# Patient Record
Sex: MALE | Race: WHITE | NOT HISPANIC OR LATINO | URBAN - METROPOLITAN AREA
[De-identification: names, ages, dates, MRNs, and addresses within clinical notes are randomized per-mention and may not be internally consistent; named-entity substitution may affect disease eponyms.]

---

## 2024-09-22 ENCOUNTER — OFFICE VISIT (OUTPATIENT)
Dept: URGENT CARE | Facility: CLINIC | Age: 34
End: 2024-09-22
Payer: COMMERCIAL

## 2024-09-22 VITALS
BODY MASS INDEX: 23.19 KG/M2 | HEART RATE: 75 BPM | SYSTOLIC BLOOD PRESSURE: 125 MMHG | DIASTOLIC BLOOD PRESSURE: 72 MMHG | RESPIRATION RATE: 18 BRPM | TEMPERATURE: 99 F | OXYGEN SATURATION: 98 % | WEIGHT: 175 LBS | HEIGHT: 73 IN

## 2024-09-22 DIAGNOSIS — S61.411A LACERATION OF RIGHT HAND WITHOUT FOREIGN BODY, INITIAL ENCOUNTER: Primary | ICD-10-CM

## 2024-09-22 PROCEDURE — 99213 OFFICE O/P EST LOW 20 MIN: CPT | Mod: S$GLB,,, | Performed by: NURSE PRACTITIONER

## 2024-09-22 RX ORDER — ACETAZOLAMIDE 250 MG/1
TABLET ORAL
COMMUNITY
Start: 2024-04-05

## 2024-09-22 RX ORDER — MUPIROCIN 20 MG/G
OINTMENT TOPICAL DAILY
Status: SHIPPED | OUTPATIENT
Start: 2024-09-22

## 2024-09-22 RX ORDER — MUPIROCIN 20 MG/G
OINTMENT TOPICAL
Qty: 22 G | Refills: 1 | Status: SHIPPED | OUTPATIENT
Start: 2024-09-22

## 2024-09-22 RX ORDER — CEPHALEXIN 500 MG/1
500 CAPSULE ORAL EVERY 6 HOURS
Qty: 14 CAPSULE | Refills: 0 | Status: SHIPPED | OUTPATIENT
Start: 2024-09-22 | End: 2024-09-22

## 2024-09-22 RX ORDER — CEPHALEXIN 500 MG/1
500 CAPSULE ORAL EVERY 6 HOURS
Qty: 28 CAPSULE | Refills: 0 | Status: SHIPPED | OUTPATIENT
Start: 2024-09-22 | End: 2024-09-29

## 2024-09-22 RX ADMIN — MUPIROCIN: 20 OINTMENT TOPICAL at 12:09

## 2024-09-22 NOTE — PROCEDURES
Laceration Repair    Date/Time: 9/22/2024 12:00 PM    Performed by: Shayla Stafford NP  Authorized by: Shayla Stafford NP  Body area: upper extremity  Location details: right hand  Laceration length: 4 cm  Foreign bodies: no foreign bodies  Tendon involvement: none  Nerve involvement: none  Vascular damage: no    Patient sedated: no  Preparation: Patient was prepped and draped in the usual sterile fashion.  Irrigation solution: saline  Irrigation method: syringe  Amount of cleaning: standard  Debridement: none  Degree of undermining: none  Skin closure: glue and Steri-Strips  Dressing: antibiotic ointment and Steri-Strips  Patient tolerance: Patient tolerated the procedure well with no immediate complications  Comments: Discussed may need 1 stitch he discussed with one stitch he would rather glue and steri strips.

## 2024-09-22 NOTE — PROGRESS NOTES
"Subjective:      Patient ID: Nakul Guerrero is a 34 y.o. male.    Vitals:  height is 6' 1" (1.854 m) and weight is 79.4 kg (175 lb). His temperature is 98.5 °F (36.9 °C). His blood pressure is 125/72 and his pulse is 75. His respiration is 18 and oxygen saturation is 98%.     Chief Complaint: Laceration    Pt presents complaints of laceration on the right hand. Injury occurred today. Pt states he was in his yard fixing his fence and did not notice a screw sticking out and when he grabbed the fence the screw cut his right hand. Pain level 2. OTC none   Provider note  Td UPT. Did not puncture but scratch hand    Laceration   The incident occurred less than 1 hour ago. The laceration is located on the Right hand. The laceration mechanism was a nail. The pain is at a severity of 2/10. The pain is mild. The pain has been Constant since onset. He reports no foreign bodies present. His tetanus status is UTD.       Skin:  Positive for laceration.      Objective:     Physical Exam   Constitutional: He is oriented to person, place, and time. He appears well-developed. He is cooperative.  Non-toxic appearance. He does not appear ill. No distress.   HENT:   Head: Normocephalic and atraumatic.   Ears:   Right Ear: Hearing normal.   Left Ear: Hearing normal.   Nose: Nose normal. No mucosal edema, rhinorrhea or nasal deformity. No epistaxis. Right sinus exhibits no maxillary sinus tenderness and no frontal sinus tenderness. Left sinus exhibits no maxillary sinus tenderness and no frontal sinus tenderness.   Mouth/Throat: Uvula is midline, oropharynx is clear and moist and mucous membranes are normal. No trismus in the jaw. Normal dentition. No uvula swelling. No oropharyngeal exudate, posterior oropharyngeal edema or posterior oropharyngeal erythema.   Eyes: Conjunctivae and lids are normal. No scleral icterus.   Neck: Trachea normal and phonation normal. Neck supple. No edema present. No erythema present. No neck rigidity " "present.   Cardiovascular: Normal rate, regular rhythm, normal heart sounds and normal pulses.   Pulmonary/Chest: Effort normal and breath sounds normal. No respiratory distress. He has no decreased breath sounds. He has no rhonchi.   Abdominal: Normal appearance.   Musculoskeletal: Normal range of motion.         General: No deformity. Normal range of motion.      Right hand: He exhibits laceration.   Neurological: He is alert and oriented to person, place, and time. He exhibits normal muscle tone. Coordination normal.   Skin: Skin is warm, dry, intact, not diaphoretic and not pale. Lacerations - upper ext.:  right hand  Psychiatric: His speech is normal and behavior is normal. Judgment and thought content normal.   Nursing note and vitals reviewed.    4" superficial laceration  noted to right palm of hand 1/2 middle part of laceration with .02 cm depth. Bleeding stopped with ice and pressure.   Assessment:     1. Laceration of right hand without foreign body, initial encounter      Discussed 1 stitch maybe beneficial however he decided to glue and steri strip.   Plan:       Laceration of right hand without foreign body, initial encounter  -     mupirocin 2 % ointment  -     mupirocin (BACTROBAN) 2 % ointment; Apply to affected area 3 times daily  Dispense: 22 g; Refill: 1  -     cephALEXin (KEFLEX) 500 MG capsule; Take 1 capsule (500 mg total) by mouth every 6 (six) hours. for 7 days  Dispense: 14 capsule; Refill: 0  -     Laceration Repair    Keep dry  Let steri strips fall off on their own. If bleeding reoccurred. Apply Ice and pressure and antibiotic ointment. Keep covered for 5 days.  ED for any fever, chills or any other complications noted.   Tylenol/ Ibuprofen for pain control as directed.               "

## 2024-09-22 NOTE — PATIENT INSTRUCTIONS
Keep dry  Let steri strips fall off on their own. If bleeding reoccurred. Apply Ice and pressure and antibiotic ointment. Keep covered for 5 days.  ED for any fever, chills or any other complications noted.   Tylenol/ Ibuprofen for pain control as directed.

## 2024-11-20 DIAGNOSIS — Z00.00 ROUTINE MEDICAL EXAM: Primary | ICD-10-CM

## 2024-12-12 ENCOUNTER — CLINICAL SUPPORT (OUTPATIENT)
Dept: INTERNAL MEDICINE | Facility: CLINIC | Age: 34
End: 2024-12-12
Payer: COMMERCIAL

## 2024-12-12 ENCOUNTER — HOSPITAL ENCOUNTER (OUTPATIENT)
Dept: CARDIOLOGY | Facility: CLINIC | Age: 34
Discharge: HOME | End: 2024-12-12
Payer: COMMERCIAL

## 2024-12-12 ENCOUNTER — HOSPITAL ENCOUNTER (OUTPATIENT)
Dept: RADIOLOGY | Facility: HOSPITAL | Age: 34
Discharge: HOME | End: 2024-12-12
Attending: INTERNAL MEDICINE
Payer: COMMERCIAL

## 2024-12-12 ENCOUNTER — OFFICE VISIT (OUTPATIENT)
Dept: INTERNAL MEDICINE | Facility: CLINIC | Age: 34
End: 2024-12-12
Payer: COMMERCIAL

## 2024-12-12 VITALS
HEIGHT: 73 IN | SYSTOLIC BLOOD PRESSURE: 124 MMHG | WEIGHT: 170 LBS | BODY MASS INDEX: 22.53 KG/M2 | DIASTOLIC BLOOD PRESSURE: 73 MMHG | HEART RATE: 64 BPM | OXYGEN SATURATION: 97 %

## 2024-12-12 DIAGNOSIS — R00.2 PALPITATIONS: ICD-10-CM

## 2024-12-12 DIAGNOSIS — Z00.00 ROUTINE MEDICAL EXAM: ICD-10-CM

## 2024-12-12 DIAGNOSIS — Z00.00 ROUTINE GENERAL MEDICAL EXAMINATION AT A HEALTH CARE FACILITY: Primary | ICD-10-CM

## 2024-12-12 DIAGNOSIS — R07.9 CHEST PAIN, UNSPECIFIED TYPE: ICD-10-CM

## 2024-12-12 DIAGNOSIS — Z00.00 ROUTINE PHYSICAL EXAMINATION: Primary | ICD-10-CM

## 2024-12-12 DIAGNOSIS — E87.6 HYPOKALEMIA: ICD-10-CM

## 2024-12-12 LAB
ALBUMIN SERPL BCP-MCNC: 4.4 G/DL (ref 3.5–5.2)
ALP SERPL-CCNC: 77 U/L (ref 40–150)
ALT SERPL W/O P-5'-P-CCNC: 21 U/L (ref 10–44)
ANION GAP SERPL CALC-SCNC: 11 MMOL/L (ref 8–16)
AST SERPL-CCNC: 20 U/L (ref 10–40)
BILIRUB SERPL-MCNC: 0.7 MG/DL (ref 0.1–1)
BUN SERPL-MCNC: 13 MG/DL (ref 6–20)
CALCIUM SERPL-MCNC: 9.5 MG/DL (ref 8.7–10.5)
CHLORIDE SERPL-SCNC: 104 MMOL/L (ref 95–110)
CHOLEST SERPL-MCNC: 163 MG/DL (ref 120–199)
CHOLEST/HDLC SERPL: 3.9 {RATIO} (ref 2–5)
CO2 SERPL-SCNC: 25 MMOL/L (ref 23–29)
CREAT SERPL-MCNC: 1 MG/DL (ref 0.5–1.4)
ERYTHROCYTE [DISTWIDTH] IN BLOOD BY AUTOMATED COUNT: 11.6 % (ref 11.5–14.5)
EST. GFR  (NO RACE VARIABLE): >60 ML/MIN/1.73 M^2
ESTIMATED AVG GLUCOSE: 94 MG/DL (ref 68–131)
GLUCOSE SERPL-MCNC: 81 MG/DL (ref 70–110)
HBA1C MFR BLD: 4.9 % (ref 4–5.6)
HCT VFR BLD AUTO: 46.8 % (ref 40–54)
HCV AB SERPL QL IA: ABNORMAL
HDLC SERPL-MCNC: 42 MG/DL (ref 40–75)
HDLC SERPL: 25.8 % (ref 20–50)
HGB BLD-MCNC: 17.2 G/DL (ref 14–18)
LDLC SERPL CALC-MCNC: 112 MG/DL (ref 63–159)
MCH RBC QN AUTO: 32.3 PG (ref 27–31)
MCHC RBC AUTO-ENTMCNC: 36.8 G/DL (ref 32–36)
MCV RBC AUTO: 88 FL (ref 82–98)
NONHDLC SERPL-MCNC: 121 MG/DL
OHS QRS DURATION: 112 MS
OHS QTC CALCULATION: 425 MS
PLATELET # BLD AUTO: 256 K/UL (ref 150–450)
PMV BLD AUTO: 11.6 FL (ref 9.2–12.9)
POTASSIUM SERPL-SCNC: 3.3 MMOL/L (ref 3.5–5.1)
PROT SERPL-MCNC: 7.4 G/DL (ref 6–8.4)
RBC # BLD AUTO: 5.32 M/UL (ref 4.6–6.2)
SODIUM SERPL-SCNC: 140 MMOL/L (ref 136–145)
TRIGL SERPL-MCNC: 45 MG/DL (ref 30–150)
TSH SERPL DL<=0.005 MIU/L-ACNC: 1.28 UIU/ML (ref 0.4–4)
WBC # BLD AUTO: 9.31 K/UL (ref 3.9–12.7)

## 2024-12-12 PROCEDURE — 93005 ELECTROCARDIOGRAM TRACING: CPT | Mod: S$GLB,,, | Performed by: INTERNAL MEDICINE

## 2024-12-12 PROCEDURE — 80061 LIPID PANEL: CPT | Performed by: INTERNAL MEDICINE

## 2024-12-12 PROCEDURE — 80053 COMPREHEN METABOLIC PANEL: CPT | Performed by: INTERNAL MEDICINE

## 2024-12-12 PROCEDURE — 97750 PHYSICAL PERFORMANCE TEST: CPT | Mod: S$GLB,,, | Performed by: INTERNAL MEDICINE

## 2024-12-12 PROCEDURE — 86803 HEPATITIS C AB TEST: CPT | Performed by: INTERNAL MEDICINE

## 2024-12-12 PROCEDURE — 99999 PR PBB SHADOW E&M-EST. PATIENT-LVL I: CPT | Mod: PBBFAC,,,

## 2024-12-12 PROCEDURE — 71046 X-RAY EXAM CHEST 2 VIEWS: CPT | Mod: TC

## 2024-12-12 PROCEDURE — 71046 X-RAY EXAM CHEST 2 VIEWS: CPT | Mod: 26,,, | Performed by: RADIOLOGY

## 2024-12-12 PROCEDURE — 83036 HEMOGLOBIN GLYCOSYLATED A1C: CPT | Performed by: INTERNAL MEDICINE

## 2024-12-12 PROCEDURE — 97802 MEDICAL NUTRITION INDIV IN: CPT | Mod: S$GLB,,, | Performed by: DIETITIAN, REGISTERED

## 2024-12-12 PROCEDURE — 93010 ELECTROCARDIOGRAM REPORT: CPT | Mod: S$GLB,,, | Performed by: STUDENT IN AN ORGANIZED HEALTH CARE EDUCATION/TRAINING PROGRAM

## 2024-12-12 PROCEDURE — 85027 COMPLETE CBC AUTOMATED: CPT | Performed by: INTERNAL MEDICINE

## 2024-12-12 PROCEDURE — 99999 PR PBB SHADOW E&M-EST. PATIENT-LVL III: CPT | Mod: PBBFAC,,, | Performed by: INTERNAL MEDICINE

## 2024-12-12 PROCEDURE — 84443 ASSAY THYROID STIM HORMONE: CPT | Performed by: INTERNAL MEDICINE

## 2024-12-12 RX ORDER — DOXYCYCLINE HYCLATE 100 MG/1
100 TABLET, DELAYED RELEASE ORAL DAILY
COMMUNITY

## 2024-12-12 NOTE — PROGRESS NOTES
"Nutrition Assessment  Session Time:  45 minutes      Client name:  Nakul Guerrero  :  1990  Age:  34 y.o.  Gender:  male    Client states:  Very pleasant [employer] employee here for his initial Executive Health physical.  Employed with [employer] for the past two years and previously with [employer].  Shares unremarkable PMH and takes no routine rx medications or supplements daily.  Maintains a healthy and active lifestyle, swimming for an hour or more 2-4x/week.  Is conscious of PO intake, inquiring about several specific nutrition-related topics.  Is interested in lipid panel, sharing results from 2.5 years ago, in which HDL was 38 and LDL was ~120-125.  Inquired about factors affecting such.  Expresses anxiety regarding blood draw and MD visits and so, does not see MD regularly unless medically necessary.  Strives to maintain a healthy lifestyle so as to reduce need for medical management.  Typically consumes three meals and occasional snacks daily.  Considers peanut butter and jelly sandwich on wheat to be an optimal breakfast choice for him and selects reduced sugar versions of both.  Dines out for lunch in [place] when working, which he realizes is unhealthy given limited availability of healthy food choices.  Nevertheless, cooks dinner in advance of the week ahead, recalling recent preparation of red beans, rice, and vegetables.  Incorporates fruit and vegetables daily and legumes weekly.  Limits intake of sugary beverages.  Snacks on starchy and sometimes sugary snacks if available.  Overall, wishes to remain proactive in managing health.    Anthropometrics  Height:  6' 1"     Weight:  166#  BMI:  21.9  % Body Fat:  10.5%    Clinical Signs/Symptoms  N/V/D:  None  Appetite:  good       No past medical history on file.    No past surgical history on file.    Medications    has a current medication list which includes the following prescription(s): acetazolamide, doxycycline, and mupirocin, and the following " Facility-Administered Medications: mupirocin.    Vitamins, Minerals, and/or Supplements:  None     Food/Medication Interactions:  Reviewed     Food Allergies or Intolerances:  Skin of soft fruit     Social History    Marital status:    Employment:  [employer]    Social History     Tobacco Use    Smoking status: Never     Passive exposure: Never    Smokeless tobacco: Never   Substance Use Topics    Alcohol use: Not Currently        Lab Reports   Sodium   Date Value Ref Range Status   12/12/2024 140 136 - 145 mmol/L Final     Potassium   Date Value Ref Range Status   12/12/2024 3.3 (L) 3.5 - 5.1 mmol/L Final     Chloride   Date Value Ref Range Status   12/12/2024 104 95 - 110 mmol/L Final     CO2   Date Value Ref Range Status   12/12/2024 25 23 - 29 mmol/L Final     Glucose   Date Value Ref Range Status   12/12/2024 81 70 - 110 mg/dL Final     BUN   Date Value Ref Range Status   12/12/2024 13 6 - 20 mg/dL Final     Creatinine   Date Value Ref Range Status   12/12/2024 1.0 0.5 - 1.4 mg/dL Final     Calcium   Date Value Ref Range Status   12/12/2024 9.5 8.7 - 10.5 mg/dL Final     Total Protein   Date Value Ref Range Status   12/12/2024 7.4 6.0 - 8.4 g/dL Final     Albumin   Date Value Ref Range Status   12/12/2024 4.4 3.5 - 5.2 g/dL Final     Total Bilirubin   Date Value Ref Range Status   12/12/2024 0.7 0.1 - 1.0 mg/dL Final     Comment:     For infants and newborns, interpretation of results should be based  on gestational age, weight and in agreement with clinical  observations.    Premature Infant recommended reference ranges:  Up to 24 hours.............<8.0 mg/dL  Up to 48 hours............<12.0 mg/dL  3-5 days..................<15.0 mg/dL  6-29 days.................<15.0 mg/dL       Alkaline Phosphatase   Date Value Ref Range Status   12/12/2024 77 40 - 150 U/L Final     AST   Date Value Ref Range Status   12/12/2024 20 10 - 40 U/L Final     ALT   Date Value Ref Range Status   12/12/2024 21 10 - 44 U/L  Final     Anion Gap   Date Value Ref Range Status   12/12/2024 11 8 - 16 mmol/L Final      Lab Results   Component Value Date    WBC 9.31 12/12/2024    HGB 17.2 12/12/2024    HCT 46.8 12/12/2024    MCV 88 12/12/2024     12/12/2024        Lab Results   Component Value Date    CHOL 163 12/12/2024     Lab Results   Component Value Date    HDL 42 12/12/2024     Lab Results   Component Value Date    LDLCALC 112.0 12/12/2024     Lab Results   Component Value Date    TRIG 45 12/12/2024     Lab Results   Component Value Date    CHOLHDL 25.8 12/12/2024     Lab Results   Component Value Date    HGBA1C 4.9 12/12/2024     BP Readings from Last 1 Encounters:   12/12/24 124/73       Food History  Breakfast:  Peanut butter and jelly sandwich on wheat + coffee  Mid-morning Snack:  None  Lunch:  Restaurant entree  Mid-afternoon Snack:  Chips/fruit/fresh vegetables  Dinner:  Red beans + rice  Snack:  None  *Fluid intake:  2-3 cups coffee + water    Exercise History:  Swims 60 minutes 2-4x/week    Cultural/Spiritual/Personal Preferences:  None identified    Support System:  friends    State of Change:  Contemplation    Barriers to Change:  None    Diagnosis    Food and nutrition related knowledge deficit related to inadequate nutrition education as evidenced by patient inquiry re:  general nutrition and heart health.    Intervention    RMR (Method:  InBody):  1826 kcal  Activity Factor:  1.4    FRANCIA:  2556 kcal    Goals:  1.  Incorporate a source of lean protein with snacks (examples discussed)  2.  Limit saturated fat intake to 12 gm/day or less  3.  Maintain 150 minutes of physical activity/week or more as tolerated  4.  Maintain healthy eating habits    Nutrition Education  The following education was provided to the patient:  Complimented patient on proactive role in health maintenance.  Complimented patient on physical activity efforts.  Discussed meal planning/[employer] My Plate design.  Discussed healthy snacking.    Discussed Heart Healthy Eating.  Suggested dietary modifications based on current dietary behaviors and individual food preferences.  Discussed macronutrient distribution among meals and snacks, including CHO, protein, and fat recommendations and its importance/benefits.  Discussed physical activity guidelines and its associated benefits.  *Lab results were pending at time of consult and so, not discussed with patient.  Discussed recommended protein intake (may include but not limited to recommended food sources, benefits of adequate protein intake, importance of protein distribution, etc.)   Discussed sugary foods and/or beverages (potential health consequences of excessive sugar intake, ways to reduce sugar intake, healthy beverage alternatives, etc.).  Discussed recommended servings of fruit/day and food sources of such.  Discussed recommended servings of non-starchy vegetables/day and food sources of such.    Patient verbalized understanding of nutrition education and recommendations received.    Handouts Provided  Meal Planning Guide  Eat Fit Shopping List  Fueling Well On-The-Go    Monitoring/Evaluation    Monitor the following:  Weight  BMI  % Body Fat  Caloric intake    Follow Up Plan:  Communication with referring healthcare provider is unnecessary at this time as patient presented as part of annual wellness exam.  However, will follow up with patient in 1-2 years.

## 2024-12-12 NOTE — LETTER
December 12, 2024    Nakul Guerrero  [address] [facility] [address]             Preet Francois [facility]  [address]  [facility] [address]  Phone: [phone]  Fax: [phone] Dear Mr. Guerrero:        Thank you for allowing me to serve you and perform your Executive Health exam on 12/12/2024.  This letter will serve a brief summary of the history, physical findings, and laboratory/studies performed and recommendations at that time.    Reason for Visit: Executive Health Preventive Physical Examination    HPI  History of Present Illness    CHIEF COMPLAINT:  Nakul presents for an executive health visit with concerns about chest pain and palpitations.    HPI:  Patient is new to me, 34-year-old male works for [employer].  He has a few issues to discuss.  Of note is that he says he does not get regular checkups because he does not like getting his blood drawn.  When at all possible he gets fingersticks including for biometric screening and periodically for STD screening.  Nakul reports chest pain for the past 5 years, initially noticed 6-9 months into the COVID-19 pandemic. The pain is localized to the left side of his chest in the rib area, described as musculoskeletal in nature. It is exacerbated by poor posture, such as prolonged sitting in a curled or static position, and can sometimes radiate to his ribs and extend downwards.    He also reports palpitations, described as occasional, single, forceful beats. These typically occur after intense swimming when resting at the pool wall, when supine, during deep breath-holding, or when sitting in a curled position.    Nakul is an avid swimmer and reports maintained swimming performance without deterioration in times or speed. He denies chest pain or palpitations during active swimming.    He expresses concern about a potential undiagnosed congenital heart defect, given his age of 34. He is worried that his high level of  physical activity might be exacerbating an underlying condition.    Nakul denies any known congenital heart defects or other diagnosed cardiac conditions.  No family history of any cardiovascular disease including arrhythmia or congenital till disease.    MEDICATIONS:  Nakul is on Doxycycline as an anti-malarial medication, which are scheduled to terminate in early January.     FAMILY HISTORY:  Family history is significant for mother with high cholesterol. His father has a history of COPD and experienced a transient ischemic attack 15 years ago. Nakul's paternal great aunt had an incapacitating stroke that led to her death, while his paternal grandmother suffered a stroke at age 86-88. His maternal grandmother had multiple strokes, was overweight, and had severe arthritis.    TEST RESULTS:  Nakul's recent Complete Blood Count showed normal levels of white cells, red cells, and platelets. His MCH and MCHC were slightly high, with the former being 0.8 above the normal range. Potassium levels were slightly low, 0.2 below the normal range. Recent cholesterol levels were normal at 160 mg/dL, compared to 174 mg/dL from a finger stick test 2.5 years ago. A1C and TSH tests were normal. The Hepatitis C Antibody Test yielded an equivocal result. An STI panel from 9 months ago was negative for the antibody but he was with a new sexual partner so potentially the opportunity for exposure was there although he says her test was negative as well.. Nakul's recent EKG was normal.  Chest x-ray was clear    IMAGING:  A recent chest XR showed lungs within normal limits with no fluid present. The lungs were clear, heart size was normal, and there were no evident rib fractures.    SOCIAL HISTORY:  Nakul smoked 1.5 packs per day from 2004 until quitting in [id number]. He used smokeless tobacco on and off from 2013 to 2019, quitting 5-6 years ago. Nakul is single and not currently dating.      ROS:  Cardiovascular: +chest pain,  +palpitations             Review of Systems   Constitutional:  Negative for chills, fatigue and fever.   HENT:  Negative for nosebleeds and trouble swallowing.    Eyes:  Negative for pain and visual disturbance.   Respiratory:  Negative for cough, shortness of breath and wheezing.    Cardiovascular:  Positive for chest pain and palpitations.   Gastrointestinal:  Negative for abdominal pain, constipation, diarrhea, nausea and vomiting.   Genitourinary:  Negative for difficulty urinating and hematuria.   Musculoskeletal:  Negative for arthralgias, back pain and neck pain.   Integumentary:  Negative for rash.   Neurological:  Negative for dizziness and headaches.   Hematological:  Does not bruise/bleed easily.   Psychiatric/Behavioral:  Negative for dysphoric mood and sleep disturbance.        History reviewed. No pertinent past medical history.  History reviewed. No pertinent surgical history.   There is no problem list on file for this patient.         Objective:      Physical Exam    Cardiovascular: Heart sounds normal, no murmurs. No tenderness to touch in chest area.  Musculoskeletal: Xiphoid process normal.  Lungs: Rib structure appears normal.       Physical Exam  Constitutional:       General: He is not in acute distress.     Appearance: He is well-developed.      Comments: Thin healthy fit-appearing male     HENT:      Head: Normocephalic and atraumatic.      Right Ear: Tympanic membrane, ear canal and external ear normal.      Left Ear: Tympanic membrane, ear canal and external ear normal.      Mouth/Throat:      Pharynx: No oropharyngeal exudate or posterior oropharyngeal erythema.   Eyes:      General: No scleral icterus.     Conjunctiva/sclera: Conjunctivae normal.      Pupils: Pupils are equal, round, and reactive to light.   Neck:      Thyroid: No thyromegaly.      Comments: No supraclavicular nodes palpated  Cardiovascular:      Rate and Rhythm: Normal rate and regular rhythm.      Pulses: Normal  "pulses.      Heart sounds: Normal heart sounds. No murmur heard.     Comments: No reproducible chest tenderness.  No deformities noted.    Pulmonary:      Effort: Pulmonary effort is normal.      Breath sounds: Normal breath sounds. No wheezing.   Abdominal:      General: Bowel sounds are normal.      Palpations: Abdomen is soft. There is no mass.      Tenderness: There is no abdominal tenderness.   Musculoskeletal:         General: No tenderness.      Cervical back: Normal range of motion and neck supple.      Right lower leg: No edema.      Left lower leg: No edema.   Lymphadenopathy:      Cervical: No cervical adenopathy.   Skin:     Coloration: Skin is not jaundiced or pale.   Neurological:      General: No focal deficit present.      Mental Status: He is alert and oriented to person, place, and time.   Psychiatric:         Mood and Affect: Mood normal.         Behavior: Behavior normal.           Assessment:       Problem List Items Addressed This Visit    None  Visit Diagnoses       Routine physical examination    -  Primary    Palpitations        Relevant Orders    Holter monitor - 48 hour    Exercise Stress - EKG    Chest pain, unspecified type        Relevant Orders    Holter monitor - 48 hour    Exercise Stress - EKG    Hypokalemia                Plan:       Nakul Mancilla" was seen today for annual exam.    Diagnoses and all orders for this visit:    Routine physical examination    Palpitations  -     Holter monitor - 48 hour; Future  -     Exercise Stress - EKG; Future    Chest pain, unspecified type  -     Holter monitor - 48 hour; Future  -     Exercise Stress - EKG; Future    Hypokalemia       I explained that the hypokalemia could be dietary or a random lab value but typically would like to repeat that.  He says because he does not like having his blood drawn that he would prefer to increase potassium rich foods and we can get it checked next time.  Certainly if it helps his palpitations that would be " "a +.  He would like to proceed with a Holter monitor but decided not to do the stress test.   Long discussion with patient about studies and recommendations.  I explained that a stress test would give indications relative to the chest pain, separate from the palpitations but he does not want to do that for now.    We spent some time discussing the equivocal hepatitis-C test results and I explained we would typically do a hepatitis-C RNA test but again with his preference not to have traditional blood draw he is thinking about getting a follow-up fingerstick hepatitis-C screening.  If that is negative it would likely mean that the current lab was just a lab variation and not a positive result.  If the repeat fingerstick test is positive would then consider a hepatitis-C RNA blood study.  He expressed understanding and all questions were answered to the best of my ability.  He also asked about testosterone or cortisol in the future and I explained that those were not part of the standard health screening panel but we could try to have them ordered if he would like.        Portions of this note may have been created with [employer] voice recognition software. Occasional "wrong-word" or "sound-a-like" substitutions may have occurred due to the inherent limitations of voice recognition software. Please, read the note carefully and recognize, using context, where substitutions have occurred.       Labs:  Component      Latest Ref Rng 12/12/2024   Sodium      136 - 145 mmol/L 140    Potassium      3.5 - 5.1 mmol/L 3.3 (L)    Chloride      95 - 110 mmol/L 104    CO2      23 - 29 mmol/L 25    Glucose      70 - 110 mg/dL 81    BUN      6 - 20 mg/dL 13    Creatinine      0.5 - 1.4 mg/dL 1.0    Calcium      8.7 - 10.5 mg/dL 9.5    PROTEIN TOTAL      6.0 - 8.4 g/dL 7.4    Albumin      3.5 - 5.2 g/dL 4.4    BILIRUBIN TOTAL      0.1 - 1.0 mg/dL 0.7    ALP      40 - 150 U/L 77    AST      10 - 40 U/L 20    ALT      10 - 44 U/L " 21    eGFR      >60 mL/min/1.73 m^2 >60.0    Anion Gap      8 - 16 mmol/L 11    WBC      3.90 - 12.70 K/uL 9.31    RBC      4.60 - 6.20 M/uL 5.32    Hemoglobin      14.0 - 18.0 g/dL 17.2    Hematocrit      40.0 - 54.0 % 46.8    MCV      82 - 98 fL 88    MCH      27.0 - 31.0 pg 32.3 (H)    MCHC      32.0 - 36.0 g/dL 36.8 (H)    RDW      11.5 - 14.5 % 11.6    Platelet Count      150 - 450 K/uL 256    MPV      9.2 - 12.9 fL 11.6    Cholesterol Total      120 - 199 mg/dL 163    Triglycerides      30 - 150 mg/dL 45    HDL      40 - 75 mg/dL 42    LDL Cholesterol      63.0 - 159.0 mg/dL 112.0    HDL/Cholesterol Ratio      20.0 - 50.0 % 25.8    Total Cholesterol/HDL Ratio      2.0 - 5.0  3.9    Non-HDL Cholesterol      mg/dL 121    Hemoglobin A1C External      4.0 - 5.6 % 4.9    Estimated Avg Glucose      68 - 131 mg/dL 94    TSH      0.400 - 4.000 uIU/mL 1.276    Hepatitis C Ab      Non-reactive  Equivocal !       Legend:  (L) Low  (H) High  ! Abnormal    Results for orders placed or performed during the hospital encounter of 12/12/24   EKG 12-lead    Collection Time: 12/12/24  8:57 AM   Result Value Ref Range    QRS Duration 112 ms    [facility] QTC Calculation 425 ms        Assessment/Recommendations:  Routine Health Maintenance    At this time, you appear to be in good medical condition.  I look forward to seeing you again next year.  Please contact me should you have any questions or concerns regarding physical findings, or my recommendations.              If you have any questions or concerns, please don't hesitate to call.    Sincerely,        Siddhartha Briggs MD

## 2024-12-12 NOTE — PROGRESS NOTES
HPI  History of Present Illness    CHIEF COMPLAINT:  Nakul presents for an executive health visit with concerns about chest pain and palpitations.    HPI:  Patient is new to me, 34-year-old male works for [employer].  He has a few issues to discuss.  Of note is that he says he does not get regular checkups because he does not like getting his blood drawn.  When at all possible he gets fingersticks including for biometric screening and periodically for STD screening.  Nakul reports chest pain for the past 5 years, initially noticed 6-9 months into the COVID-19 pandemic. The pain is localized to the left side of his chest in the rib area, described as musculoskeletal in nature. It is exacerbated by poor posture, such as prolonged sitting in a curled or static position, and can sometimes radiate to his ribs and extend downwards.    He also reports palpitations, described as occasional, single, forceful beats. These typically occur after intense swimming when resting at the pool wall, when supine, during deep breath-holding, or when sitting in a curled position.    Nakul is an avid swimmer and reports maintained swimming performance without deterioration in times or speed. He denies chest pain or palpitations during active swimming.    He expresses concern about a potential undiagnosed congenital heart defect, given his age of 34. He is worried that his high level of physical activity might be exacerbating an underlying condition.    Nakul denies any known congenital heart defects or other diagnosed cardiac conditions.  No family history of any cardiovascular disease including arrhythmia or congenital till disease.    MEDICATIONS:  Nakul is on Doxycycline as an anti-malarial medication, which are scheduled to terminate in early January.     FAMILY HISTORY:  Family history is significant for mother with high cholesterol. His father has a history of COPD and experienced a transient ischemic attack 15 years  ago. Nakul's paternal great aunt had an incapacitating stroke that led to her death, while his paternal grandmother suffered a stroke at age 86-88. His maternal grandmother had multiple strokes, was overweight, and had severe arthritis.    TEST RESULTS:  Nakul's recent Complete Blood Count showed normal levels of white cells, red cells, and platelets. His MCH and MCHC were slightly high, with the former being 0.8 above the normal range. Potassium levels were slightly low, 0.2 below the normal range. Recent cholesterol levels were normal at 160 mg/dL, compared to 174 mg/dL from a finger stick test 2.5 years ago. A1C and TSH tests were normal. The Hepatitis C Antibody Test yielded an equivocal result. An STI panel from 9 months ago was negative for the antibody but he was with a new sexual partner so potentially the opportunity for exposure was there although he says her test was negative as well.. Nakul's recent EKG was normal.  Chest x-ray was clear    IMAGING:  A recent chest XR showed lungs within normal limits with no fluid present. The lungs were clear, heart size was normal, and there were no evident rib fractures.    SOCIAL HISTORY:  Nakul smoked 1.5 packs per day from 2004 until quitting in 4284-8140. He used smokeless tobacco on and off from 2013 to 2019, quitting 5-6 years ago. Nakul is single and not currently dating.      ROS:  Cardiovascular: +chest pain, +palpitations             Review of Systems   Constitutional:  Negative for chills, fatigue and fever.   HENT:  Negative for nosebleeds and trouble swallowing.    Eyes:  Negative for pain and visual disturbance.   Respiratory:  Negative for cough, shortness of breath and wheezing.    Cardiovascular:  Positive for chest pain and palpitations.   Gastrointestinal:  Negative for abdominal pain, constipation, diarrhea, nausea and vomiting.   Genitourinary:  Negative for difficulty urinating and hematuria.   Musculoskeletal:  Negative for arthralgias,  back pain and neck pain.   Integumentary:  Negative for rash.   Neurological:  Negative for dizziness and headaches.   Hematological:  Does not bruise/bleed easily.   Psychiatric/Behavioral:  Negative for dysphoric mood and sleep disturbance.        History reviewed. No pertinent past medical history.  History reviewed. No pertinent surgical history.   There is no problem list on file for this patient.         Objective:      Physical Exam    Cardiovascular: Heart sounds normal, no murmurs. No tenderness to touch in chest area.  Musculoskeletal: Xiphoid process normal.  Lungs: Rib structure appears normal.       Physical Exam  Constitutional:       General: He is not in acute distress.     Appearance: He is well-developed.      Comments: Thin healthy fit-appearing male     HENT:      Head: Normocephalic and atraumatic.      Right Ear: Tympanic membrane, ear canal and external ear normal.      Left Ear: Tympanic membrane, ear canal and external ear normal.      Mouth/Throat:      Pharynx: No oropharyngeal exudate or posterior oropharyngeal erythema.   Eyes:      General: No scleral icterus.     Conjunctiva/sclera: Conjunctivae normal.      Pupils: Pupils are equal, round, and reactive to light.   Neck:      Thyroid: No thyromegaly.      Comments: No supraclavicular nodes palpated  Cardiovascular:      Rate and Rhythm: Normal rate and regular rhythm.      Pulses: Normal pulses.      Heart sounds: Normal heart sounds. No murmur heard.     Comments: No reproducible chest tenderness.  No deformities noted.    Pulmonary:      Effort: Pulmonary effort is normal.      Breath sounds: Normal breath sounds. No wheezing.   Abdominal:      General: Bowel sounds are normal.      Palpations: Abdomen is soft. There is no mass.      Tenderness: There is no abdominal tenderness.   Musculoskeletal:         General: No tenderness.      Cervical back: Normal range of motion and neck supple.      Right lower leg: No edema.      Left  "lower leg: No edema.   Lymphadenopathy:      Cervical: No cervical adenopathy.   Skin:     Coloration: Skin is not jaundiced or pale.   Neurological:      General: No focal deficit present.      Mental Status: He is alert and oriented to person, place, and time.   Psychiatric:         Mood and Affect: Mood normal.         Behavior: Behavior normal.           Assessment:       Problem List Items Addressed This Visit    None  Visit Diagnoses       Routine physical examination    -  Primary    Palpitations        Relevant Orders    Holter monitor - 48 hour    Exercise Stress - EKG    Chest pain, unspecified type        Relevant Orders    Holter monitor - 48 hour    Exercise Stress - EKG    Hypokalemia                Plan:       Nakul Mancilla" was seen today for annual exam.    Diagnoses and all orders for this visit:    Routine physical examination    Palpitations  -     Holter monitor - 48 hour; Future  -     Exercise Stress - EKG; Future    Chest pain, unspecified type  -     Holter monitor - 48 hour; Future  -     Exercise Stress - EKG; Future    Hypokalemia       I explained that the hypokalemia could be dietary or a random lab value but typically would like to repeat that.  He says because he does not like having his blood drawn that he would prefer to increase potassium rich foods and we can get it checked next time.  Certainly if it helps his palpitations that would be a +.  He would like to proceed with a Holter monitor but decided not to do the stress test.   Long discussion with patient about studies and recommendations.  I explained that a stress test would give indications relative to the chest pain, separate from the palpitations but he does not want to do that for now.    We spent some time discussing the equivocal hepatitis-C test results and I explained we would typically do a hepatitis-C RNA test but again with his preference not to have traditional blood draw he is thinking about getting a follow-up " "fingerstick hepatitis-C screening.  If that is negative it would likely mean that the current lab was just a lab variation and not a positive result.  If the repeat fingerstick test is positive would then consider a hepatitis-C RNA blood study.  He expressed understanding and all questions were answered to the best of my ability.  He also asked about testosterone or cortisol in the future and I explained that those were not part of the standard health screening panel but we could try to have them ordered if he would like.        Portions of this note may have been created with [employer] voice recognition software. Occasional "wrong-word" or "sound-a-like" substitutions may have occurred due to the inherent limitations of voice recognition software. Please, read the note carefully and recognize, using context, where substitutions have occurred.  "

## 2024-12-12 NOTE — PROGRESS NOTES
Pt. Has no significant cardiovascular or pulmonary history.    Physical Limitations:  Patient has a history of left shoulder impingement for which he completed physical therapy.  He has undiagnosed left sided sternocostal joint pain.  Patient denied any limitations to physical activity.      Current exercise routine:  Patient currently malone for 60-75 minutes, 3-4 days a week and stretches after.  Patient does not follow any formal resistance training routine at the current time.    Goals:  Patient would like to start a regular resistance training routine.    Notes:  Patient was friendly and engaged.  He noted that he was anxious about getting his blood drawn and usually avoids doctors due to his anxiety.  He is dedicated to his swimming routine and enjoys it but stated that he knows he should be strength training as well.  He was open to the idea of body weight exercises and then getting resistance bands when he is ready to progress the routine.  Patient asked questions and was receptive to all recommendations.      The fitness evaluation results are as follows:    D.O.S. 12/12/2024   Height (in): 73   Weight (lbs): 166   BMI: 21.822319   Body Fat (%): 10.50   Waist (cm): 79   RBP (mmHg): 116/84   RHR (bpm): 58    Strength Dominant (Lbs): 152    Strength Non Dominant (Lbs): 140   Push-up Assessment: 30   Curl-up Assessment: 48   Flexibility Testing (cm): 35   REE (kcals): 1826       Age/gender stratified assessment:    Resting BP: Within Normal Limits   Body Fat %: Excellent   Waist Circumfernece: Very Low Risk    Strength Dominant: 90th    Strength Non Dominant: 90th   Upper Body Endurance: Excellent   Abdominal Endurance: Above Average   Lower body Flexibiltiy: Very Good       Recommended fitness guidelines:    -150 minutes of moderate intensity aerobic exercise per week or 75 minutes of vigorous intensity aerobic exercise per week.    -2 to 4 days per week of resistance training for each muscle  group.  Practice full-body, body-weight resistance exercises such as push-ups, planks, squats, and lunges, 2 days a week.      -Daily stretching with a hold of at least 30 seconds per muscle group.

## 2025-01-14 ENCOUNTER — CLINICAL SUPPORT (OUTPATIENT)
Dept: CARDIOLOGY | Facility: HOSPITAL | Age: 35
End: 2025-01-14
Attending: INTERNAL MEDICINE
Payer: COMMERCIAL

## 2025-01-14 DIAGNOSIS — R00.2 PALPITATIONS: ICD-10-CM

## 2025-01-14 DIAGNOSIS — R07.9 CHEST PAIN, UNSPECIFIED TYPE: ICD-10-CM

## 2025-01-14 PROCEDURE — 93226 XTRNL ECG REC<48 HR SCAN A/R: CPT

## 2025-01-14 PROCEDURE — 93227 XTRNL ECG REC<48 HR R&I: CPT | Mod: ,,, | Performed by: STUDENT IN AN ORGANIZED HEALTH CARE EDUCATION/TRAINING PROGRAM

## 2025-01-25 LAB
OHS CV EVENT MONITOR DAY: 0
OHS CV HOLTER LENGTH DECIMAL HOURS: 47.98
OHS CV HOLTER LENGTH HOURS: 47
OHS CV HOLTER LENGTH MINUTES: 59
OHS CV HOLTER SINUS AVERAGE HR: 67
OHS CV HOLTER SINUS MAX HR: 162
OHS CV HOLTER SINUS MIN HR: 41

## 2025-01-28 ENCOUNTER — PATIENT MESSAGE (OUTPATIENT)
Dept: INTERNAL MEDICINE | Facility: CLINIC | Age: 35
End: 2025-01-28
Payer: COMMERCIAL

## 2025-06-19 ENCOUNTER — OFFICE VISIT (OUTPATIENT)
Dept: URGENT CARE | Facility: CLINIC | Age: 35
End: 2025-06-19
Payer: COMMERCIAL

## 2025-06-19 VITALS
WEIGHT: 170 LBS | OXYGEN SATURATION: 97 % | RESPIRATION RATE: 18 BRPM | HEART RATE: 66 BPM | SYSTOLIC BLOOD PRESSURE: 153 MMHG | BODY MASS INDEX: 22.53 KG/M2 | HEIGHT: 73 IN | TEMPERATURE: 98 F | DIASTOLIC BLOOD PRESSURE: 95 MMHG

## 2025-06-19 DIAGNOSIS — H60.332 ACUTE SWIMMER'S EAR OF LEFT SIDE: Primary | ICD-10-CM

## 2025-06-19 DIAGNOSIS — H92.02 OTALGIA, LEFT EAR: ICD-10-CM

## 2025-06-19 PROCEDURE — 99213 OFFICE O/P EST LOW 20 MIN: CPT | Mod: S$GLB,,, | Performed by: NURSE PRACTITIONER

## 2025-06-19 RX ORDER — FEXOFENADINE HCL 60 MG/1
60 TABLET, FILM COATED ORAL
COMMUNITY

## 2025-06-19 RX ORDER — OFLOXACIN 3 MG/ML
10 SOLUTION AURICULAR (OTIC) DAILY
Qty: 5 ML | Refills: 0 | Status: SHIPPED | OUTPATIENT
Start: 2025-06-19 | End: 2025-06-26

## 2025-06-19 RX ORDER — DOXYCYCLINE 100 MG/1
CAPSULE ORAL
COMMUNITY
Start: 2024-11-20 | End: 2025-06-19 | Stop reason: ALTCHOICE

## 2025-06-19 NOTE — PROGRESS NOTES
"Subjective:      Patient ID: Nakul Guerrero is a 35 y.o. male.    Vitals:  height is 6' 1" (1.854 m) and weight is 77.1 kg (169 lb 15.6 oz). His oral temperature is 97.9 °F (36.6 °C). His blood pressure is 153/95 (abnormal) and his pulse is 66. His respiration is 18 and oxygen saturation is 97%.     Chief Complaint: Otalgia    35 year old male presenting with L- sided jaw, sinus, and ear pain x 3 days. Pt finished a course of Augmentin 2 wks ago.     35-year-old male presents to clinic with complaints of ear pain, left side jaw pain, sinus pain x 3 days. He reports treatment with Augmentin two weeks ago for skin infection left elbow and forearm in [place].      Otalgia   There is pain in the left ear. This is a new problem. The current episode started in the past 7 days. The problem occurs constantly. The problem has been gradually worsening. Pertinent negatives include no coughing, ear discharge, hearing loss or sore throat. He has tried NSAIDs for the symptoms. The treatment provided mild relief.     HENT:  Positive for ear pain. Negative for ear discharge, foreign body in ear, tinnitus, hearing loss, congestion and sore throat.    Respiratory:  Negative for cough.    Skin:  Negative for erythema.      Objective:     Physical Exam   Constitutional: He is oriented to person, place, and time. He appears well-developed.   HENT:   Head: Normocephalic and atraumatic. Head is without abrasion, without contusion and without laceration.   Ears:   Right Ear: External ear normal. Tympanic membrane is not erythematous.   Left Ear: External ear normal. There is tenderness. Tympanic membrane is not erythematous.   Nose: Nose normal.   Mouth/Throat: Oropharynx is clear and moist and mucous membranes are normal.   Left inner ear canal with erythema      Comments: Left inner ear canal with erythema  Eyes: EOM and lids are normal. Extraocular movement intact   Neck: Trachea normal and phonation normal. Neck supple. "   Cardiovascular: Normal rate.   Pulmonary/Chest: Effort normal. No stridor. No respiratory distress.   Musculoskeletal: Normal range of motion.         General: Normal range of motion.   Neurological: He is alert and oriented to person, place, and time.   Skin: Skin is warm, dry, intact and no rash. Capillary refill takes less than 2 seconds. No abrasion, No burn, No bruising, No erythema and No ecchymosis   Psychiatric: His speech is normal and behavior is normal. Judgment and thought content normal.   Nursing note and vitals reviewed.      Assessment:     1. Acute swimmer's ear of left side    2. Otalgia, left ear        Plan:       Acute swimmer's ear of left side  -     ofloxacin (FLOXIN) 0.3 % otic solution; Place 10 drops into the left ear once daily. for 7 days  Dispense: 5 mL; Refill: 0    Otalgia, left ear       Take your drugs as ordered by your doctor.  Sit or lie down with your head to the side and the ear that needs the ear drops pointing up.  Pull on your ear to straighten the ear canal.  Gently pull the ear up and toward the back of the head to straighten it. If you are giving the ear drops to a child under 3 years of age, gently pull the earlobe down and toward the back of the head.  Put the correct number of drops in your ear.  Gently press the small skin flap over the ear to help the drops run into the ear.  Continue to sit or lie with your head to the side for 5 minutes.  Your doctor may want you to put a small cotton plug in your ear to help keep the drops in place.  Keep the inside of your ear dry while it heals. You can protect your ear when you shower with a petroleum jelly-coated cotton ball. Avoid swimming for 7 to 10 days.  Do not use in-ear head phones (ear buds) or hearing aids while your ear heals.  Please return here or go to the Emergency Department for any concerns or worsening of condition.    Please follow up with your primary care doctor or specialist as needed.    If you  smoke,  please stop smoking.

## 2025-06-19 NOTE — PATIENT INSTRUCTIONS
Take your drugs as ordered by your doctor.  Sit or lie down with your head to the side and the ear that needs the ear drops pointing up.  Pull on your ear to straighten the ear canal.  Gently pull the ear up and toward the back of the head to straighten it. If you are giving the ear drops to a child under 3 years of age, gently pull the earlobe down and toward the back of the head.  Put the correct number of drops in your ear.  Gently press the small skin flap over the ear to help the drops run into the ear.  Continue to sit or lie with your head to the side for 5 minutes.  Your doctor may want you to put a small cotton plug in your ear to help keep the drops in place.  Keep the inside of your ear dry while it heals. You can protect your ear when you shower with a petroleum jelly-coated cotton ball. Avoid swimming for 7 to 10 days.  Do not use in-ear head phones (ear buds) or hearing aids while your ear heals.  Please return here or go to the Emergency Department for any concerns or worsening of condition.    Please follow up with your primary care doctor or specialist as needed.    If you  smoke, please stop smoking.

## 2025-06-23 ENCOUNTER — OFFICE VISIT (OUTPATIENT)
Dept: URGENT CARE | Facility: CLINIC | Age: 35
End: 2025-06-23
Payer: COMMERCIAL

## 2025-06-23 ENCOUNTER — TELEPHONE (OUTPATIENT)
Dept: URGENT CARE | Facility: CLINIC | Age: 35
End: 2025-06-23

## 2025-06-23 VITALS
OXYGEN SATURATION: 99 % | RESPIRATION RATE: 20 BRPM | DIASTOLIC BLOOD PRESSURE: 89 MMHG | WEIGHT: 169 LBS | SYSTOLIC BLOOD PRESSURE: 131 MMHG | TEMPERATURE: 98 F | BODY MASS INDEX: 22.4 KG/M2 | HEIGHT: 73 IN | HEART RATE: 75 BPM

## 2025-06-23 DIAGNOSIS — Z09 FOLLOW-UP EXAM: Primary | ICD-10-CM

## 2025-06-23 DIAGNOSIS — H92.02 OTALGIA, LEFT EAR: ICD-10-CM

## 2025-06-23 DIAGNOSIS — H60.332 SWIMMER'S EAR OF LEFT SIDE, UNSPECIFIED CHRONICITY: ICD-10-CM

## 2025-06-23 PROCEDURE — 99499 UNLISTED E&M SERVICE: CPT | Mod: S$GLB,,, | Performed by: NURSE PRACTITIONER

## 2025-06-23 RX ORDER — CIPROFLOXACIN HYDROCHLORIDE AND HYDROCORTISONE 2; 10 MG/ML; MG/ML
3 SUSPENSION/ DROPS AURICULAR (OTIC) 2 TIMES DAILY
Qty: 10 ML | Refills: 0 | Status: SHIPPED | OUTPATIENT
Start: 2025-06-23 | End: 2025-06-30

## 2025-06-23 RX ORDER — AMOXICILLIN AND CLAVULANATE POTASSIUM 875; 125 MG/1; MG/1
1 TABLET, FILM COATED ORAL EVERY 12 HOURS
Qty: 14 TABLET | Refills: 0 | Status: SHIPPED | OUTPATIENT
Start: 2025-06-23 | End: 2025-06-30

## 2025-06-23 NOTE — PROGRESS NOTES
"Subjective:      Patient ID: Nakul Guerrero is a 35 y.o. male.    Vitals:  height is 6' 1" (1.854 m) and weight is 76.7 kg (169 lb). His temperature is 98.2 °F (36.8 °C). His blood pressure is 131/89 and his pulse is 75. His respiration is 20 and oxygen saturation is 99%.     Chief Complaint: Otalgia    Pt presents with complaint of left ear pain x3-4 days.  Pt states he was seen and prescribed ear drops for his symptoms.  Pt states he has used prescribed drops for about 4 days and has had no relief of symptoms.     35-year-old male presents to clinic with complaints of persistent left ear pain after treatment with Augmentin and ofloxacin.     Otalgia   There is pain in the left ear. This is a recurrent problem. The current episode started in the past 7 days. The problem occurs constantly. The problem has been unchanged. There has been no fever. The pain is at a severity of 5/10. The pain is moderate. Pertinent negatives include no ear discharge or hearing loss. He has tried ear drops for the symptoms. The treatment provided mild relief.       Constitution: Negative for chills, sweating, fatigue and fever.   HENT:  Positive for ear pain. Negative for ear discharge, foreign body in ear, tinnitus and hearing loss.    Skin:  Negative for erythema.      Objective:     Physical Exam   Constitutional: He is oriented to person, place, and time. He appears well-developed.   HENT:   Head: Normocephalic and atraumatic. Head is without abrasion, without contusion and without laceration.   Ears:   Right Ear: External ear normal.   Left Ear: External ear normal. There is swelling and tenderness. No no drainage. Tympanic membrane is not erythematous.   Nose: Nose normal.   Mouth/Throat: Oropharynx is clear and moist and mucous membranes are normal.   Eyes: EOM and lids are normal. Extraocular movement intact   Neck: Trachea normal and phonation normal. Neck supple.   Cardiovascular: Normal rate.   Pulmonary/Chest: Effort normal. " No stridor. No respiratory distress.   Musculoskeletal: Normal range of motion.         General: Normal range of motion.   Neurological: He is alert and oriented to person, place, and time.   Skin: Skin is warm, dry, intact and no rash. Capillary refill takes less than 2 seconds. No abrasion, No burn, No bruising, No erythema and No ecchymosis   Psychiatric: His speech is normal and behavior is normal. Judgment and thought content normal.   Nursing note and vitals reviewed.      Assessment:     1. Follow-up exam    2. Swimmer's ear of left side, unspecified chronicity    3. Otalgia, left ear        Plan:       Follow-up exam    Swimmer's ear of left side, unspecified chronicity  -     ciprofloxacin-hydrocortisone 0.2-1% (CIPRO HC) otic suspension; Place 3 drops into the left ear 2 (two) times daily. for 7 days  Dispense: 10 mL; Refill: 0    Otalgia, left ear    Other orders  -     amoxicillin-clavulanate 875-125mg (AUGMENTIN) 875-125 mg per tablet; Take 1 tablet by mouth every 12 (twelve) hours. for 7 days  Dispense: 14 tablet; Refill: 0      Patient Instructions   Please drink plenty of fluids.  Please get plenty of rest.  Please return here or go to the Emergency Department for any concerns or worsening of condition.  You were given wait & see antibiotics, please wait 3-5 days before taking them, and only take them if your symptoms have worsened or not improved.  If you do begin taking the antibiotics, please take them to completion.  If not allergic, please take over the counter Tylenol (Acetaminophen) and/or Motrin (Ibuprofen) as directed for control of pain and/or fever.  Please follow up with your primary care doctor or specialist as needed.    If you  smoke, please stop smoking.

## 2025-06-23 NOTE — TELEPHONE ENCOUNTER
pharmacy called clinic request change in medication due to cost of cipro drops $300+, also patient completed course of Augmentin within the past 3 weeks, medications changed; patient called and notified of changes

## 2025-06-23 NOTE — PATIENT INSTRUCTIONS
Please drink plenty of fluids.  Please get plenty of rest.  Please return here or go to the Emergency Department for any concerns or worsening of condition.  You were given wait & see antibiotics, please wait 3-5 days before taking them, and only take them if your symptoms have worsened or not improved.  If you do begin taking the antibiotics, please take them to completion.  If not allergic, please take over the counter Tylenol (Acetaminophen) and/or Motrin (Ibuprofen) as directed for control of pain and/or fever.  Please follow up with your primary care doctor or specialist as needed.    If you  smoke, please stop smoking.